# Patient Record
Sex: MALE | HISPANIC OR LATINO | Employment: UNEMPLOYED | URBAN - METROPOLITAN AREA
[De-identification: names, ages, dates, MRNs, and addresses within clinical notes are randomized per-mention and may not be internally consistent; named-entity substitution may affect disease eponyms.]

---

## 2017-02-03 ENCOUNTER — HOSPITAL ENCOUNTER (EMERGENCY)
Facility: MEDICAL CENTER | Age: 62
End: 2017-02-04
Attending: EMERGENCY MEDICINE

## 2017-02-03 DIAGNOSIS — R10.11 RIGHT UPPER QUADRANT ABDOMINAL PAIN: ICD-10-CM

## 2017-02-03 DIAGNOSIS — K80.20 SYMPTOMATIC CHOLELITHIASIS: ICD-10-CM

## 2017-02-03 LAB
ALBUMIN SERPL BCP-MCNC: 4.4 G/DL (ref 3.2–4.9)
ALBUMIN/GLOB SERPL: 1.5 G/DL
ALP SERPL-CCNC: 72 U/L (ref 30–99)
ALT SERPL-CCNC: 23 U/L (ref 2–50)
ANION GAP SERPL CALC-SCNC: 11 MMOL/L (ref 0–11.9)
AST SERPL-CCNC: 14 U/L (ref 12–45)
BASOPHILS # BLD AUTO: 0.4 % (ref 0–1.8)
BASOPHILS # BLD: 0.05 K/UL (ref 0–0.12)
BILIRUB SERPL-MCNC: 0.5 MG/DL (ref 0.1–1.5)
BUN SERPL-MCNC: 23 MG/DL (ref 8–22)
CALCIUM SERPL-MCNC: 9.4 MG/DL (ref 8.5–10.5)
CHLORIDE SERPL-SCNC: 100 MMOL/L (ref 96–112)
CO2 SERPL-SCNC: 23 MMOL/L (ref 20–33)
CREAT SERPL-MCNC: 1.05 MG/DL (ref 0.5–1.4)
EKG IMPRESSION: NORMAL
EOSINOPHIL # BLD AUTO: 0.01 K/UL (ref 0–0.51)
EOSINOPHIL NFR BLD: 0.1 % (ref 0–6.9)
ERYTHROCYTE [DISTWIDTH] IN BLOOD BY AUTOMATED COUNT: 44.8 FL (ref 35.9–50)
GFR SERPL CREATININE-BSD FRML MDRD: >60 ML/MIN/1.73 M 2
GLOBULIN SER CALC-MCNC: 3 G/DL (ref 1.9–3.5)
GLUCOSE SERPL-MCNC: 152 MG/DL (ref 65–99)
HCT VFR BLD AUTO: 45.4 % (ref 42–52)
HGB BLD-MCNC: 14.9 G/DL (ref 14–18)
IMM GRANULOCYTES # BLD AUTO: 0.04 K/UL (ref 0–0.11)
IMM GRANULOCYTES NFR BLD AUTO: 0.3 % (ref 0–0.9)
LIPASE SERPL-CCNC: 27 U/L (ref 11–82)
LYMPHOCYTES # BLD AUTO: 1.82 K/UL (ref 1–4.8)
LYMPHOCYTES NFR BLD: 13.4 % (ref 22–41)
MCH RBC QN AUTO: 27.5 PG (ref 27–33)
MCHC RBC AUTO-ENTMCNC: 32.8 G/DL (ref 33.7–35.3)
MCV RBC AUTO: 83.9 FL (ref 81.4–97.8)
MONOCYTES # BLD AUTO: 0.52 K/UL (ref 0–0.85)
MONOCYTES NFR BLD AUTO: 3.8 % (ref 0–13.4)
NEUTROPHILS # BLD AUTO: 11.13 K/UL (ref 1.82–7.42)
NEUTROPHILS NFR BLD: 82 % (ref 44–72)
NRBC # BLD AUTO: 0 K/UL
NRBC BLD AUTO-RTO: 0 /100 WBC
PLATELET # BLD AUTO: 244 K/UL (ref 164–446)
PMV BLD AUTO: 9.2 FL (ref 9–12.9)
POTASSIUM SERPL-SCNC: 3.7 MMOL/L (ref 3.6–5.5)
PROT SERPL-MCNC: 7.4 G/DL (ref 6–8.2)
RBC # BLD AUTO: 5.41 M/UL (ref 4.7–6.1)
SODIUM SERPL-SCNC: 134 MMOL/L (ref 135–145)
WBC # BLD AUTO: 13.6 K/UL (ref 4.8–10.8)

## 2017-02-03 PROCEDURE — 83690 ASSAY OF LIPASE: CPT

## 2017-02-03 PROCEDURE — 94760 N-INVAS EAR/PLS OXIMETRY 1: CPT

## 2017-02-03 PROCEDURE — 700111 HCHG RX REV CODE 636 W/ 250 OVERRIDE (IP): Performed by: EMERGENCY MEDICINE

## 2017-02-03 PROCEDURE — 85730 THROMBOPLASTIN TIME PARTIAL: CPT

## 2017-02-03 PROCEDURE — 93005 ELECTROCARDIOGRAM TRACING: CPT | Performed by: EMERGENCY MEDICINE

## 2017-02-03 PROCEDURE — 36415 COLL VENOUS BLD VENIPUNCTURE: CPT

## 2017-02-03 PROCEDURE — 99285 EMERGENCY DEPT VISIT HI MDM: CPT

## 2017-02-03 PROCEDURE — 96375 TX/PRO/DX INJ NEW DRUG ADDON: CPT

## 2017-02-03 PROCEDURE — 80053 COMPREHEN METABOLIC PANEL: CPT

## 2017-02-03 PROCEDURE — 700111 HCHG RX REV CODE 636 W/ 250 OVERRIDE (IP)

## 2017-02-03 PROCEDURE — 85025 COMPLETE CBC W/AUTO DIFF WBC: CPT

## 2017-02-03 PROCEDURE — 96374 THER/PROPH/DIAG INJ IV PUSH: CPT

## 2017-02-03 PROCEDURE — 85610 PROTHROMBIN TIME: CPT

## 2017-02-03 RX ORDER — ONDANSETRON 2 MG/ML
INJECTION INTRAMUSCULAR; INTRAVENOUS
Status: COMPLETED
Start: 2017-02-03 | End: 2017-02-03

## 2017-02-03 RX ORDER — SODIUM CHLORIDE, SODIUM LACTATE, POTASSIUM CHLORIDE, CALCIUM CHLORIDE 600; 310; 30; 20 MG/100ML; MG/100ML; MG/100ML; MG/100ML
1000 INJECTION, SOLUTION INTRAVENOUS ONCE
Status: COMPLETED | OUTPATIENT
Start: 2017-02-04 | End: 2017-02-04

## 2017-02-03 RX ORDER — MORPHINE SULFATE 4 MG/ML
4 INJECTION, SOLUTION INTRAMUSCULAR; INTRAVENOUS ONCE
Status: COMPLETED | OUTPATIENT
Start: 2017-02-04 | End: 2017-02-03

## 2017-02-03 RX ORDER — ONDANSETRON 2 MG/ML
4 INJECTION INTRAMUSCULAR; INTRAVENOUS ONCE
Status: COMPLETED | OUTPATIENT
Start: 2017-02-04 | End: 2017-02-03

## 2017-02-03 RX ADMIN — SODIUM CHLORIDE, POTASSIUM CHLORIDE, SODIUM LACTATE AND CALCIUM CHLORIDE 1000 ML: 600; 310; 30; 20 INJECTION, SOLUTION INTRAVENOUS at 23:43

## 2017-02-03 RX ADMIN — MORPHINE SULFATE 4 MG: 4 INJECTION INTRAVENOUS at 23:41

## 2017-02-03 RX ADMIN — ONDANSETRON 4 MG: 2 INJECTION, SOLUTION INTRAMUSCULAR; INTRAVENOUS at 23:34

## 2017-02-03 RX ADMIN — ONDANSETRON 4 MG: 2 INJECTION INTRAMUSCULAR; INTRAVENOUS at 23:34

## 2017-02-03 ASSESSMENT — PAIN SCALES - GENERAL: PAINLEVEL_OUTOF10: 3

## 2017-02-03 NOTE — ED AVS SNAPSHOT
2/4/2017          Addison Levi  [address]  [place] [address]    Dear Addison:    [facility] wants to ensure your discharge home is safe and you or your loved ones have had all your questions answered regarding your care after you leave the hospital.    You may receive a telephone call within two days of your discharge.  This call is to make certain you understand your discharge instructions as well as ensure we provided you with the best care possible during your stay with us.     The call will only last approximately 3-5 minutes and will be done by a nurse.    Once again, we want to ensure your discharge home is safe and that you have a clear understanding of any next steps in your care.  If you have any questions or concerns, please do not hesitate to contact us, we are here for you.  Thank you for choosing [facility] for your healthcare needs.    Sincerely,    Song Kumar    [facility]

## 2017-02-03 NOTE — ED AVS SNAPSHOT
After Visit Summary                                                                                                                Addison Levi   MRN: [id number]    Department:  [facility], Emergency Dept   Date of Visit:  2/3/2017            [facility], Emergency Dept    [address] [place] [address]    Phone:  [phone]      You were seen by     Elio Turpin M.D.      Your Diagnosis Was     Right upper quadrant abdominal pain     R10.11       These are the medications you received during your hospitalization from 02/03/2017 2305 to 02/04/2017 0320     Date/Time Order Dose Route Action    02/03/2017 2343 LR infusion (bolus) 1,000 mL Intravenous New Bag    02/03/2017 2341 morphine (pf) 4 mg/ml injection 4 mg 4 mg Intravenous Given    02/03/2017 2334 ondansetron (ZOFRAN) syringe/vial injection 4 mg 4 mg Intravenous Given    02/04/2017 0149 HYDROmorphone (DILAUDID) injection 1 mg 1 mg Intravenous Given    02/04/2017 0148 ondansetron (ZOFRAN) syringe/vial injection 4 mg 4 mg Intravenous Given      Follow-up Information     1. Follow up with [facility], Emergency Dept.    Specialty:  Emergency Medicine    Why:  in 12-24 hours if symptoms persist,, immediately if symptoms worsen    Contact information    [address] [place] [id number] [phone]        2. Schedule an appointment as soon as possible for a visit with Ubaldo Redding M.D..    Specialty:  Surgery    Contact information    [address] [facility] [address]  [facility] [id number] [phone]        Medication Information     Review all of your home medications and newly ordered medications with your primary doctor and/or pharmacist as soon as possible. Follow medication instructions as directed by your doctor and/or pharmacist.     Please keep your complete medication list with you and share with your physician. Update the information when medications are discontinued, doses are  changed, or new medications (including over-the-counter products) are added; and carry medication information at all times in the event of emergency situations.               Medication List      START taking these medications        Instructions    hydrocodone-acetaminophen 5-325 MG Tabs per tablet   Commonly known as:  NORCO    Take 1-2 Tabs by mouth every four hours as needed.   Dose:  1-2 Tab       ondansetron 4 MG Tbdp   Commonly known as:  ZOFRAN ODT    Take 1 Tab by mouth every 6 hours as needed for Nausea/Vomiting.   Dose:  4 mg               Procedures and tests performed during your visit     APTT    CARDIAC MONITORING    CBC WITH DIFFERENTIAL    COMP METABOLIC PANEL    CONSENT FOR CONTRAST INJECTION    CT-ABDOMEN-PELVIS WITH    Cardiac Monitoring    EKG (ER)    ESTIMATED GFR    IV Saline Lock    LIPASE    O2 Protocol    OLD EKG    Oxygen    PROTHROMBIN TIME (INR)    Pulse Ox    SALINE LOCK    URINALYSIS CULTURE, IF INDICATED    US-GALLBLADDER        Discharge Instructions       Dolor abdominal, versión ampliada  (Abdominal Pain, Nonspecific)  El análisis podría no mostrar la razón exacta por la que tiene dolor abdominal. Debido a que hay muchas causas distintas de dolor abdominal, se podrá necesitar otro control y más análisis. Es muy importante el seguimiento para observar los síntomas duraderos (persistentes) o los que empeoran. Ann causa posible de dolor abdominal en cualquier persona que aún tiene roberts apéndice es la apendicitis aguda. La apendicitis es a menudo difícil de diagnosticar. Los análisis de lebron, orina, ultrasonido y tomografía computada no pueden descartar por completo la apendicitis u otra causas de dolor abdominal. A veces, sólo los cambios que se producen a través del tiempo permitirán determinar si el dolor abdominal se debe al apendicitis o a otras causas. Otros problemas potenciales que pueden requerir cirugía también pueden ugo algún tiempo hasta ser evidentes. Debido a esto, es  importante seguir todas las instrucciones de más abajo.  INSTRUCCIONES PARA EL CUIDADO DOMICILIARIO  · Descanse todo lo que pueda.   · No ingiera alimentos sólidos hasta que el dolor desaparezca.   · Cuando un adulto o un jose siente dolor: Puede beneficiarlo zach dieta basada en agua, té liviano descafeinado, caldo o consomé, gelatina, solución de rehidratación oral, helados de agua o trocitos de hielo.   · Cuando el adulto o el jose no sienten más dolor: Consuma zach dieta liviana (tostadas secas, crackers, jugo de manzana o arroz chester). Incorpore más alimentos lentamente, siempre que esto no le cause ningún trastorno. No consuma productos lácteos (incluyendo queso y huevos) ni ingiera alimentos condimentados, grasos, fritos o con gran cantidad de fibra.   · No consuma alcohol, cafeína ni cigarrillos.   · [place] deacon medicamentos regularmente, excepto que el profesional le indique lo contrario.   · Utilice los medicamentos de venta risa o de prescripción para el dolor, el malestar o la fiebre, según se lo indique el profesional que lo asiste.   · Utilice los medicamentos de venta risa o de prescripción para el dolor, el malestar o la fiebre, según se lo indique el profesional que lo asiste. No administre aspirina a los niños.   Si el médico le ha dado fecha para zach visita de control, es importante que concurra. No cumplir con tuut control puede josafat bisi resultado que el daño, el dolor o la discapacidad steven permanentes (crónicos). Si tiene problemas para asistir al control, deberá comunicarlo en tutu establecimiento para recibir asesoramiento.   SOLICITE ATENCIÓN MÉDICA DE INMEDIATO SI:  · Usted o roberts jose soto sufrido dolor por más de 24 horas.   · El dolor empeora, cambia de lugar o se siente diferente.   · Usted o roberts jose tienen zach temperatura oral de más de 102° F (38.9° C) y no puede ser controlada con medicamentos.   · Roberts bebé tiene más de 3 meses y roberts temperatura rectal es de 102° F (38.9° C) o más.   · Roberts  bebé tiene 3 meses o menos y roberts temperatura rectal es de 100.4° F (38° C) o más.   · Usted o roberts hijo tienen escalofríos.   · Continúan con vómitos y no pueden retener líquidos.   · Observa lebron en el vómito o en la materia fecal.   · Las heces son oscuras o negras.   · Los movimientos intestinales son frecuentes.   · Los movimientos intestinales se detienen (hay zach obstrucción) o no pueden eliminarse los gases.   · Siente dolor al orinar o lo hace con frecuencia u observa lebron en la orina.   · La piel y la cheli nick de los ojos cambian de color y se tornan amarillos.   · Observa que el estómago se hincha o está más magda.   · Sienten mareos o desmayos.   · Sienten dolor en el pecho o la espalda.   ESTÉ SEGURO QUE:   · Comprende las instrucciones para el robby médica.   · Controlará roberts enfermedad.   · Solicitará atención médica de inmediato según las indicaciones.   Document Released: 03/26/2009 Document Revised: 03/11/2013  ExitCare® Patient Information ©2013 [employer].  Colelitiasis  (Cholelithiasis)  La colelitiasis (también llamada cálculos en la vesícula) es zach enfermedad en la que se oma piedras en la vesícula. La vesícula es un órgano que almacena la bilis que se forma en el hígado y que ayuda a digerir grasas. Los cálculos comienzan bisi pequeños anamika y lentamente se transforman en piedras. El dolor en la vesícula ocurre cuando se producen espasmos y los cálculos obstruyen el conducto. El dolor también se produce cuando zach epi sale por el conducto.   FACTORES DE RIESGO  · Ser donell.    · Tener embarazos múltiples. Algunas veces los médicos aconsejan extirpar los cálculos biliares antes de futuros embarazos.    · Ser rosangela.  · Dietas que incluyan comidas fritas y grasas.    · Ser mayor de 60 años y el aumento de la edad.    · El uso prolongado de medicamentos que contengan hormonas femeninas.    · Tener diabetes mellitus.    · Pérdida rápida de peso.    · Historia familiar de cálculos  "(herencia).    SÍNTOMAS  · Náuseas.    · Vómitos.  · Dolor abdominal.    · Piel amarilla (ictericia)    · Dolor súbito. Puede persistir desde algunos minutos hasta algunas horas.  · Fiebre.    · Sensibilidad al tacto.   En algunos casos, cuando los cálculos biliares no se mueven hacia el conducto biliar, las personas no sienten dolor ni presentan síntomas. Estos se denominan cálculos \"silenciosos\".   TRATAMIENTO  Los cálculos silenciosos no requieren tratamiento. En los casos graves, podrá ser necesaria zach cirugía de urgencia. Las opciones de tratamiento son:  · Cirugía para extirpar la vesícula. Es el tratamiento más frecuente.  · Medicamentos. No siempre dan resultado y pueden demorar entre 6 y 12 meses o más en hacer efecto.  · Tratamiento con ondas de choque (litotricia biliar extracorporal). En tutu tratamiento, zach máquina de ultrasonido envía ondas de choque a la vesícula para destruir los cálculos en pequeños fragmentos que luego podrán pasar a los intestinos o ser disueltas con medicamentos.  INSTRUCCIONES PARA EL CUIDADO EN EL HOGAR   · Sólo tome medicamentos de venta risa o recetados para calmar el dolor, el malestar o bajar la fiebre, según las indicaciones de roberts médico.    · Siga zach dieta baja en grasas hasta que roberts médico lo sushil nuevamente. Las grasas hacen que la vesícula se contraiga, lo que puede producir dolor.    · Concurra a las consultas de control con roberts médico según las indicaciones. Los ataques ranjan siempre son recurrentes y generalmente habrá que someterse a zach cirugía bisi tratamiento permanente.    SOLICITE ATENCIÓN MÉDICA DE INMEDIATO SI:   · El dolor aumenta y no puede controlarlo con los medicamentos.    · Tiene fiebre o síntomas persistentes oscar más de 2 - 3 ruby.    · Tiene fiebre y los síntomas empeoran repentinamente.    · Tiene náuseas o vómitos persistentes.    ASEGÚRESE DE QUE:   · Comprende estas instrucciones.  · Controlará roberts afección.  · Recibirá ayuda de inmediato si " no mejora o si empeora.     Esta información no tiene bisi fin reemplazar el consejo del médico. Asegúrese de hacerle al médico cualquier pregunta que tenga.     Document Released: 10/04/2007 Document Revised: 08/20/2014  [employer] Interactive Patient Education ©2016 [employer]            Patient Information     Patient Information    Following emergency treatment: all patient requiring follow-up care must return either to a private physician or a clinic if your condition worsens before you are able to obtain further medical attention, please return to the emergency room.     Billing Information    At [facility], we work to make the billing process streamlined for our patients.  Our Representatives are here to answer any questions you may have regarding your hospital bill.  If you have insurance coverage and have supplied your insurance information to us, we will submit a claim to your insurer on your behalf.  Should you have any questions regarding your bill, we can be reached online or by phone as follows:  Online: You are able pay your bills online or live chat with our representatives about any billing questions you may have. We are here to help Monday - Friday from 8:00am to 7:30pm and 9:00am - 12:00pm on Saturdays.  Please visit [email or web address]  for more information.   Phone:  [phone] or [phone]    Please note that your emergency physician, surgeon, pathologist, radiologist, anesthesiologist, and other specialists are not employed by [facility] and will therefore bill separately for their services.  Please contact them directly for any questions concerning their bills at the numbers below:     Emergency Physician Services:  [phone]  [facility]:  [phone]  [facility]:  [phone]  [facility]:  [phone]    1. Your final bill may vary from the amount quoted upon discharge if all procedures are not  complete at that time, or if your doctor has additional procedures of which we are not aware. You will receive an additional bill if you return to the Emergency Department at [facility] for suture removal regardless of the facility of which the sutures were placed.     2. Please arrange for settlement of this account at the emergency registration.    3. All self-pay accounts are due in full at the time of treatment.  If you are unable to meet this obligation then payment is expected within 4-5 days.     4. If you have had radiology studies (CT, X-ray, Ultrasound, MRI), you have received a preliminary result during your emergency department visit. Please contact the radiology department [phone] to receive a copy of your final result. Please discuss the Final result with your primary physician or with the follow up physician provided.     Crisis Hotline:  [place] Crisis Hotline:  7-301-NNYQMXU or 1-805.743.3378  [place] Crisis Hotline:    1-498.449.7014 or 517-784-5960         ED Discharge Follow Up Questions    1. In order to provide you with very good care, we would like to follow up with a phone call in the next few days.  May we have your permission to contact you?     YES /  NO    2. What is the best phone number to call you? (       )_____-__________    3. What is the best time to call you?      Morning  /  Afternoon  /  Evening                   Patient Signature:  ____________________________________________________________    Date:  ____________________________________________________________      Your appointments     Feb 04, 2017 11:55 PM   CT [id number] with [facility] CT TRAUMA   [facility]    [address] [facility] [address]   [phone]

## 2017-02-03 NOTE — ED AVS SNAPSHOT
[email or web address] Access Code: [email or web address]  Expires: 3/6/2017  3:20 AM    Your email address is not on file at [facility].  Email Addresses are required for you to sign up for [email or web address], please contact [phone] to verify your personal information and to provide your email address prior to attempting to register for [email or web address].    Addison Levi  [address] [place] [address]    [email or web address]  A secure, online tool to manage your health information     [facility]’s [email or web address]® is a secure, online tool that connects you to your personalized health information from the privacy of your home -- day or night - making it very easy for you to manage your healthcare. Once the activation process is completed, you can even access your medical information using the [email or web address] jayda, which is available for free in the Apple Jayda store or Google Play store.     To learn more about [email or web address], visit [email or web address]    There are two levels of access available (as shown below):   My Chart Features  [facility] Primary Care Doctor [facility]  Specialists [facility]  Urgent  Care Non-[facility] Primary Care Doctor   Email your healthcare team securely and privately 24/7 X X X    Manage appointments: schedule your next appointment; view details of past/upcoming appointments X      Request prescription refills. X      View recent personal medical records, including lab and immunizations X X X X   View health record, including health history, allergies, medications X X X X   Read reports about your outpatient visits, procedures, consult and ER notes X X X X   See your discharge summary, which is a recap of your hospital and/or ER visit that includes your diagnosis, lab results, and care plan X X  X     How to register for [employer]:  Once your e-mail address has been verified, follow the following steps to sign up for [email or web address].     1. Go to  [email or web address]  2. Click on the Sign Up Now box, which takes you to the New Member Sign Up page. You will  need to provide the following information:  a. Enter your [facility] Access Code exactly as it appears at the top of this page. (You will not need to use this code after you’ve completed the sign-up process. If you do not sign up before the expiration date, you must request a new code.)   b. Enter your date of birth.   c. Enter your home email address.   d. Click Submit, and follow the next screen’s instructions.  3. Create a Beanstalk Taxt ID. This will be your [facility] login ID and cannot be changed, so think of one that is secure and easy to remember.  4. Create a [facility] password. You can change your password at any time.  5. Enter your Password Reset Question and Answer. This can be used at a later time if you forget your password.   6. Enter your e-mail address. This allows you to receive e-mail notifications when new information is available in [facility].  7. Click Sign Up. You can now view your health information.    For assistance activating your [facility] account, call [phone]

## 2017-02-04 ENCOUNTER — APPOINTMENT (OUTPATIENT)
Dept: RADIOLOGY | Facility: MEDICAL CENTER | Age: 62
End: 2017-02-04
Attending: EMERGENCY MEDICINE

## 2017-02-04 VITALS
RESPIRATION RATE: 1 BRPM | WEIGHT: 160 LBS | OXYGEN SATURATION: 97 % | BODY MASS INDEX: 25.71 KG/M2 | HEIGHT: 66 IN | HEART RATE: 55 BPM | SYSTOLIC BLOOD PRESSURE: 137 MMHG | DIASTOLIC BLOOD PRESSURE: 84 MMHG

## 2017-02-04 LAB
APPEARANCE UR: CLEAR
APTT PPP: 22.7 SEC (ref 24.7–36)
BILIRUB UR QL STRIP.AUTO: NEGATIVE
COLOR UR: NORMAL
CULTURE IF INDICATED INDCX: NO UA CULTURE
GLUCOSE UR STRIP.AUTO-MCNC: NEGATIVE MG/DL
INR PPP: 0.93 (ref 0.87–1.13)
KETONES UR STRIP.AUTO-MCNC: NEGATIVE MG/DL
LEUKOCYTE ESTERASE UR QL STRIP.AUTO: NEGATIVE
MICRO URNS: NORMAL
NITRITE UR QL STRIP.AUTO: NEGATIVE
PH UR STRIP.AUTO: 5.5 [PH]
PROT UR QL STRIP: NEGATIVE MG/DL
PROTHROMBIN TIME: 12.8 SEC (ref 12–14.6)
RBC UR QL AUTO: NEGATIVE
SP GR UR STRIP.AUTO: 1.02

## 2017-02-04 PROCEDURE — 74177 CT ABD & PELVIS W/CONTRAST: CPT

## 2017-02-04 PROCEDURE — 700111 HCHG RX REV CODE 636 W/ 250 OVERRIDE (IP): Performed by: EMERGENCY MEDICINE

## 2017-02-04 PROCEDURE — 76705 ECHO EXAM OF ABDOMEN: CPT

## 2017-02-04 PROCEDURE — 96375 TX/PRO/DX INJ NEW DRUG ADDON: CPT

## 2017-02-04 PROCEDURE — 96376 TX/PRO/DX INJ SAME DRUG ADON: CPT

## 2017-02-04 PROCEDURE — 81003 URINALYSIS AUTO W/O SCOPE: CPT

## 2017-02-04 RX ORDER — ONDANSETRON 2 MG/ML
4 INJECTION INTRAMUSCULAR; INTRAVENOUS ONCE
Status: COMPLETED | OUTPATIENT
Start: 2017-02-04 | End: 2017-02-04

## 2017-02-04 RX ORDER — HYDROCODONE BITARTRATE AND ACETAMINOPHEN 5; 325 MG/1; MG/1
1-2 TABLET ORAL EVERY 4 HOURS PRN
Qty: 21 TAB | Refills: 0 | Status: ON HOLD | OUTPATIENT
Start: 2017-02-04 | End: 2017-02-21

## 2017-02-04 RX ORDER — ONDANSETRON 4 MG/1
4 TABLET, ORALLY DISINTEGRATING ORAL EVERY 6 HOURS PRN
Qty: 10 TAB | Refills: 0 | Status: ON HOLD | OUTPATIENT
Start: 2017-02-04 | End: 2017-02-21

## 2017-02-04 RX ADMIN — ONDANSETRON 4 MG: 2 INJECTION, SOLUTION INTRAMUSCULAR; INTRAVENOUS at 01:48

## 2017-02-04 RX ADMIN — HYDROMORPHONE HYDROCHLORIDE 1 MG: 1 INJECTION, SOLUTION INTRAMUSCULAR; INTRAVENOUS; SUBCUTANEOUS at 01:49

## 2017-02-04 NOTE — ED PROVIDER NOTES
"ED Provider Note    Scribed for Elio Turpin M.D. by Anneliese Bucio. 2/3/2017, 11:23 PM.    Means of arrival: ambulance  History obtained from: patient  History limited by: none (translated by daughter)    CHIEF COMPLAINT  Chief Complaint   Patient presents with   • N/V   • Abdominal Pain       HPI  Addison Levi is a 61 y.o. male who presents to the Emergency Department for worsening  right sided abdominal pain onset around 2PM with associated vomiting(3x), nausea, back pain, and dizziness. Patient has never underwent any abdominal surgeries. No complaints of bloody urine, painful urination, bowel movement irregularities. Patient has no allergies or outstanding medical problems. Pains currently rated as a 10 out of 10. No alleviating factors. Patient did state that his pain began after eating a very spicy meal at lunch. He's had no headache no altered mental status congestion chest pain or shortness of breath. No other complaints at this time. Patient did state that the pain initially started in the right lower quadrant then progressed to the periumbilical region.    REVIEW OF SYSTEMS  10 systems reviewed and otherwise negative, pertinent positives and negatives listed in the history of present illness.    PAST MEDICAL HISTORY   no pertinent past medical history    SURGICAL HISTORY  patient denies any surgical history    SOCIAL HISTORY  No pertinent social history    FAMILY HISTORY  Non-Contributory    CURRENT MEDICATIONS  Home Medications     Reviewed by Nerissa Segal R.N. (Registered Nurse) on 02/03/17 at 2350  Med List Status: Partial    Medication Last Dose Status          Patient Gilles Taking any Medications                        ALLERGIES  No Known Allergies    PHYSICAL EXAM  VITAL SIGNS: /84 mmHg  Pulse 60  Resp 18  Ht 1.676 m (5' 5.98\")  Wt 72.576 kg (160 lb)  BMI 25.84 kg/m2  SpO2 99%    Pulse ox interpretation: I interpret this pulse ox as normal.  Constitutional: Alert and " oriented x 3, moderate Distress, actively vomiting  HEENT: Atraumatic normocephalic, pupils are equal round reactive to light extraocular movements are intact. The nares is clear, external ears are normal, mouth shows moist mucous membranes  Neck: Supple, no JVD no tracheal deviation  Cardiovascular: borderline tachycardia and normal rhythm no murmur rub or gallop 2+ pulses peripherally x4  Thorax & Lungs: No respiratory distress, no wheezes rales or rhonchi, No chest tenderness.   GI: Soft, dipti umbilical and RLQ tenderness ,non distended positive bowel sounds, no peritoneal signs  Skin: Warm dry no acute rash or lesion  Musculoskeletal: Moving all extremities with full range and 5 of 5 strength, no acute  deformity  Neurologic: Cranial nerves III through XII are grossly intact, no sensory deficit, no cerebellar dysfunction   Psychiatric: Appropriate affect for situation at this time      DIAGNOSTIC STUDIES / PROCEDURES  LABS  Results for orders placed or performed during the hospital encounter of 02/03/17   CBC WITH DIFFERENTIAL   Result Value Ref Range    WBC 13.6 (H) 4.8 - 10.8 K/uL    RBC 5.41 4.70 - 6.10 M/uL    Hemoglobin 14.9 14.0 - 18.0 g/dL    Hematocrit 45.4 42.0 - 52.0 %    MCV 83.9 81.4 - 97.8 fL    MCH 27.5 27.0 - 33.0 pg    MCHC 32.8 (L) 33.7 - 35.3 g/dL    RDW 44.8 35.9 - 50.0 fL    Platelet Count 244 164 - 446 K/uL    MPV 9.2 9.0 - 12.9 fL    Neutrophils-Polys 82.00 (H) 44.00 - 72.00 %    Lymphocytes 13.40 (L) 22.00 - 41.00 %    Monocytes 3.80 0.00 - 13.40 %    Eosinophils 0.10 0.00 - 6.90 %    Basophils 0.40 0.00 - 1.80 %    Immature Granulocytes 0.30 0.00 - 0.90 %    Nucleated RBC 0.00 /100 WBC    Neutrophils (Absolute) 11.13 (H) 1.82 - 7.42 K/uL    Lymphs (Absolute) 1.82 1.00 - 4.80 K/uL    Monos (Absolute) 0.52 0.00 - 0.85 K/uL    Eos (Absolute) 0.01 0.00 - 0.51 K/uL    Baso (Absolute) 0.05 0.00 - 0.12 K/uL    Immature Granulocytes (abs) 0.04 0.00 - 0.11 K/uL    NRBC (Absolute) 0.00 K/uL   COMP  METABOLIC PANEL   Result Value Ref Range    Sodium 134 (L) 135 - 145 mmol/L    Potassium 3.7 3.6 - 5.5 mmol/L    Chloride 100 96 - 112 mmol/L    Co2 23 20 - 33 mmol/L    Anion Gap 11.0 0.0 - 11.9    Glucose 152 (H) 65 - 99 mg/dL    Bun 23 (H) 8 - 22 mg/dL    Creatinine 1.05 0.50 - 1.40 mg/dL    Calcium 9.4 8.5 - 10.5 mg/dL    AST(SGOT) 14 12 - 45 U/L    ALT(SGPT) 23 2 - 50 U/L    Alkaline Phosphatase 72 30 - 99 U/L    Total Bilirubin 0.5 0.1 - 1.5 mg/dL    Albumin 4.4 3.2 - 4.9 g/dL    Total Protein 7.4 6.0 - 8.2 g/dL    Globulin 3.0 1.9 - 3.5 g/dL    A-G Ratio 1.5 g/dL   LIPASE   Result Value Ref Range    Lipase 27 11 - 82 U/L   URINALYSIS CULTURE, IF INDICATED   Result Value Ref Range    Color Lt. Yellow     Character Clear     Specific Gravity 1.024 <1.035    Ph 5.5 5.0-8.0    Glucose Negative Negative mg/dL    Ketones Negative Negative mg/dL    Protein Negative Negative mg/dL    Bilirubin Negative Negative    Nitrite Negative Negative    Leukocyte Esterase Negative Negative    Occult Blood Negative Negative    Micro Urine Req see below     Culture Indicated No UA Culture   PROTHROMBIN TIME (INR)   Result Value Ref Range    PT 12.8 12.0 - 14.6 sec    INR 0.93 0.87 - 1.13   APTT   Result Value Ref Range    APTT 22.7 (L) 24.7 - 36.0 sec   ESTIMATED GFR   Result Value Ref Range    GFR If African American >60 >60 mL/min/1.73 m 2    GFR If Non African American >60 >60 mL/min/1.73 m 2       All labs reviewed by me.    EKG Interpretation  Interpreted by me    Rhythm:  Normal sinus rhythm   Rate: 55  Axis: normal  Ectopy: none  Conduction: normal  ST Segments: no acute change  T Waves: no acute change  Q Waves: none  Clinical Impression: 1st degree AV block    RADIOLOGY  US-GALLBLADDER   Final Result         1. Cholelithiasis. No sonographic evidence of acute cholecystitis.      2. Mildly echogenic liver, likely mild hepatic steatosis.      CT-ABDOMEN-PELVIS WITH   Final Result         1. No acute abnormality identified  "in the abdomen or pelvis.      2. Large hiatal hernia.        The radiologist's interpretation of all radiological studies have been reviewed by me.    COURSE & MEDICAL DECISION MAKING  Pertinent Labs & Imaging studies reviewed. (See chart for details)    11:23 PM - Patient seen and examined at bedside. Patient will be treated with 4mg IV Morphine and 4mg IV Zofran. Ordered abdomen pelvis CT, UA culture, prothrombin time, APTT, CBC, CMP, lipase, UA, and EKG to evaluate his symptoms. I voiced my concerns with a possible problem with the patient's appendix.    1:18 AM I re-evaluated patient at bedside. He is still experiencing abdominal pain and nausea, patient does state that he pain is radiated further into the right upper quadrant.. I informed him that his CT and blood work came back relatively normal so I no longer believe he is suffering from Appendicitis. I will order a gall bladder US for further evaluation.    3:05 AM I re-evaluated patient at bedside and informed him that he will be discharged. He reports feeling completely resolved of all symptoms. I counseled him on pre-cautionary measures and advised him to return to the ER for any new onset of fever, blood in his stool or bloody emesis. He will follow up with general surgery as an outpatient for possibility of elective cholecystectomy. His repeat abdominal exam is benign vital signs improved.  /84 mmHg  Pulse 55  Resp 1  Ht 1.676 m (5' 5.98\")  Wt 72.576 kg (160 lb)  BMI 25.84 kg/m2  SpO2 97%      Medical Decision Making: Very pleasant 61-year-old male presents with vague abdominal pain. In moderate distress at arrival. CT shows no acute inflammatory process or other surgical process. Labs are fairly unremarkable very slight leukocytosis with left shift no elevation of immature granulocytes. Patient's pain was completely alleviated with IV Dilaudid and Zofran in the ER. His repeat abdominal exam is completely benign. Ultrasound was performed " "which does show cholelithiasis without evidence of cholecystitis. Patient's instructed to avoid very fatty meals. He is given prescription for Norco and Zofran. Instructed to take stool softener with pain medication. The patient understands return in 12-24 hours if having ongoing abdominal pain sooner should he develop fevers nausea vomiting or any other acute symptoms of concern he is otherwise to follow-up with general surgery as outpatient. Discharged home in stable condition./84 mmHg  Pulse 55  Resp 1  Ht 1.676 m (5' 5.98\")  Wt 72.576 kg (160 lb)  BMI 25.84 kg/m2  SpO2 97%      I reviewed prescription monitoring program for patient's narcotic use before prescribing a scheduled drug.The patient will not drink alcohol nor drive with prescribed medications. The patient will return for new or worsening symptoms and is stable at the time of discharge.    The patient is referred to a primary physician for blood pressure management, diabetic screening, and for all other preventative health concerns.    DISPOSITION:  Patient will be discharged home in stable condition.    FOLLOW UP:  [facility], Emergency Dept  [address] [id number] [phone]    in 12-24 hours if symptoms persist,, immediately if symptoms worsen    Ubaldo Redding M.D.  [address]  [facility] [id number] [phone]    Schedule an appointment as soon as possible for a visit        OUTPATIENT MEDICATIONS:  Discharge Medication List as of 2/4/2017  3:20 AM      START taking these medications    Details   hydrocodone-acetaminophen (NORCO) 5-325 MG Tab per tablet Take 1-2 Tabs by mouth every four hours as needed., Disp-21 Tab, R-0, Print Rx Paper      ondansetron (ZOFRAN ODT) 4 MG TABLET DISPERSIBLE Take 1 Tab by mouth every 6 hours as needed for Nausea/Vomiting., Disp-10 Tab, R-0, Print Rx Paper                 FINAL IMPRESSION  1. Right upper quadrant abdominal pain    2. Symptomatic " cholelithiasis           This dictation has been created using voice recognition software and/or scribes. The accuracy of the dictation is limited by the abilities of the software and the expertise of the scribes. I expect there may be some errors of grammar and possibly content. I made every attempt to manually correct the errors within my dictation. However, errors related to voice recognition software and/or scribes may still exist and should be interpreted within the appropriate context.     I, Anneliese Bucio (Scribe), am scribing for, and in the presence of, Elio Turpin M.D..    Electronically signed by: Anneliese Bucio (Scribe), 2/3/2017    I, Elio Turpin M.D. personally performed the services described in this documentation, as scribed by Anneliese Bucio in my presence, and it is both accurate and complete.    The note accurately reflects work and decisions made by me.  Elio Turpin  2/4/2017  5:29 AM

## 2017-02-04 NOTE — ED NOTES
Pt presents via [facility] for nausea, vomiting, and abdominal pain RLQ x 4 hours, pt is pale, cool, diaphoretic. Denies any medical history. Speaks in full sentences, resps unlabored.

## 2017-02-04 NOTE — DISCHARGE INSTRUCTIONS
Dolor abdominal, versión ampliada  (Abdominal Pain, Nonspecific)  El análisis podría no mostrar la razón exacta por la que tiene dolor abdominal. Debido a que hay muchas causas distintas de dolor abdominal, se podrá necesitar otro control y más análisis. Es muy importante el seguimiento para observar los síntomas duraderos (persistentes) o los que empeoran. Zach causa posible de dolor abdominal en cualquier persona que aún tiene roberts apéndice es la apendicitis aguda. La apendicitis es a menudo difícil de diagnosticar. Los análisis de lebron, orina, ultrasonido y tomografía computada no pueden descartar por completo la apendicitis u otra causas de dolor abdominal. A veces, sólo los cambios que se producen a través del tiempo permitirán determinar si el dolor abdominal se debe al apendicitis o a otras causas. Otros problemas potenciales que pueden requerir cirugía también pueden ugo algún tiempo hasta ser evidentes. Debido a esto, es importante seguir todas las instrucciones de más abajo.  INSTRUCCIONES PARA EL CUIDADO DOMICILIARIO  · Descanse todo lo que pueda.   · No ingiera alimentos sólidos hasta que el dolor desaparezca.   · Cuando un adulto o un jose siente dolor: Puede beneficiarlo zach dieta basada en agua, té liviano descafeinado, caldo o consomé, gelatina, solución de rehidratación oral, helados de agua o trocitos de hielo.   · Cuando el adulto o el jose no sienten más dolor: Consuma zach dieta liviana (tostadas secas, crackers, jugo de manzana o arroz chester). Incorpore más alimentos lentamente, siempre que esto no le cause ningún trastorno. No consuma productos lácteos (incluyendo queso y huevos) ni ingiera alimentos condimentados, grasos, fritos o con gran cantidad de fibra.   · No consuma alcohol, cafeína ni cigarrillos.   · [place] de[place] medicamentos regularmente, excepto que el profesional le indique lo contrario.   · Utilice los medicamentos de venta risa o de prescripción para el dolor, el malestar o la  fiebre, según se lo indique el profesional que lo asiste.   · Utilice los medicamentos de venta risa o de prescripción para el dolor, el malestar o la fiebre, según se lo indique el profesional que lo asiste. No administre aspirina a los niños.   Si el médico le ha dado fecha para zach visita de control, es importante que concurra. No cumplir con tutu control puede josafat bisi resultado que el daño, el dolor o la discapacidad steven permanentes (crónicos). Si tiene problemas para asistir al control, deberá comunicarlo en tutu establecimiento para recibir asesoramiento.   SOLICITE ATENCIÓN MÉDICA DE INMEDIATO SI:  · Usted o roberts jose soto sufrido dolor por más de 24 horas.   · El dolor empeora, cambia de lugar o se siente diferente.   · Usted o roberts jose tienen zach temperatura oral de más de 102° F (38.9° C) y no puede ser controlada con medicamentos.   · Roberts bebé tiene más de 3 meses y roberts temperatura rectal es de 102° F (38.9° C) o más.   · Roberts bebé tiene 3 meses o menos y roberts temperatura rectal es de 100.4° F (38° C) o más.   · Usted o roberts hijo tienen escalofríos.   · Continúan con vómitos y no pueden retener líquidos.   · Observa lebron en el vómito o en la materia fecal.   · Las heces son oscuras o negras.   · Los movimientos intestinales son frecuentes.   · Los movimientos intestinales se detienen (hay zach obstrucción) o no pueden eliminarse los gases.   · Siente dolor al orinar o lo hace con frecuencia u observa lebron en la orina.   · La piel y la cheli nick de los ojos cambian de color y se tornan amarillos.   · Observa que el estómago se hincha o está más magda.   · Sienten mareos o desmayos.   · Sienten dolor en el pecho o la espalda.   ESTÉ SEGURO QUE:   · Comprende las instrucciones para el robby médica.   · Controlará roberts enfermedad.   · Solicitará atención médica de inmediato según las indicaciones.   Document Released: 03/26/2009 Document Revised: 03/11/2013  [employer]® Patient Information ©2013 [facility] [place],  "LL[employer].  Colelitiasis  (Cholelithiasis)  La colelitiasis (también llamada cálculos en la vesícula) es zach enfermedad en la que se oma piedras en la vesícula. La vesícula es un órgano que almacena la bilis que se forma en el hígado y que ayuda a digerir grasas. Los cálculos comienzan bisi pequeños anamika y lentamente se transforman en piedras. El dolor en la vesícula ocurre cuando se producen espasmos y los cálculos obstruyen el conducto. El dolor también se produce cuando zach epi sale por el conducto.   FACTORES DE RIESGO  · Ser donell.    · Tener embarazos múltiples. Algunas veces los médicos aconsejan extirpar los cálculos biliares antes de futuros embarazos.    · Ser rosangela.  · Dietas que incluyan comidas fritas y grasas.    · Ser mayor de 60 años y el aumento de la edad.    · El uso prolongado de medicamentos que contengan hormonas femeninas.    · Tener diabetes mellitus.    · Pérdida rápida de peso.    · Historia familiar de cálculos (herencia).    SÍNTOMAS  · Náuseas.    · Vómitos.  · Dolor abdominal.    · Piel amarilla (ictericia)    · Dolor súbito. Puede persistir desde algunos minutos hasta algunas horas.  · Fiebre.    · Sensibilidad al tacto.   En algunos casos, cuando los cálculos biliares no se mueven hacia el conducto biliar, las personas no sienten dolor ni presentan síntomas. Estos se denominan cálculos \"silenciosos\".   TRATAMIENTO  Los cálculos silenciosos no requieren tratamiento. En los casos graves, podrá ser necesaria zach cirugía de urgencia. Las opciones de tratamiento son:  · Cirugía para extirpar la vesícula. Es el tratamiento más frecuente.  · Medicamentos. No siempre dan resultado y pueden demorar entre 6 y 12 meses o más en hacer efecto.  · Tratamiento con ondas de choque (litotricia biliar extracorporal). En tutu tratamiento, zach máquina de ultrasonido envía ondas de choque a la vesícula para destruir los cálculos en pequeños fragmentos que luego podrán pasar a los intestinos o ser " disueltas con medicamentos.  INSTRUCCIONES PARA EL CUIDADO EN EL HOGAR   · Sólo tome medicamentos de venta risa o recetados para calmar el dolor, el malestar o bajar la fiebre, según las indicaciones de roberts médico.    · Siga zach dieta baja en grasas hasta que roberts médico lo sushil nuevamente. Las grasas hacen que la vesícula se contraiga, lo que puede producir dolor.    · Concurra a las consultas de control con roberts médico según las indicaciones. Los ataques ranjan siempre son recurrentes y generalmente habrá que someterse a zach cirugía bisi tratamiento permanente.    SOLICITE ATENCIÓN MÉDICA DE INMEDIATO SI:   · El dolor aumenta y no puede controlarlo con los medicamentos.    · Tiene fiebre o síntomas persistentes oscar más de 2 - 3 ruby.    · Tiene fiebre y los síntomas empeoran repentinamente.    · Tiene náuseas o vómitos persistentes.    ASEGÚRESE DE QUE:   · Comprende estas instrucciones.  · Controlará roberts afección.  · Recibirá ayuda de inmediato si no mejora o si empeora.     Esta información no tiene bisi fin reemplazar el consejo del médico. Asegúrese de hacerle al médico cualquier pregunta que tenga.     Document Released: 10/04/2007 Document Revised: 08/20/2014  [employer] Patient Education ©2016 [employer]

## 2017-02-17 ENCOUNTER — APPOINTMENT (OUTPATIENT)
Dept: ADMISSIONS | Facility: MEDICAL CENTER | Age: 62
End: 2017-02-17

## 2017-02-21 ENCOUNTER — HOSPITAL ENCOUNTER (OUTPATIENT)
Facility: MEDICAL CENTER | Age: 62
End: 2017-02-21
Attending: SURGERY | Admitting: SURGERY

## 2017-02-21 VITALS
HEIGHT: 67 IN | RESPIRATION RATE: 18 BRPM | WEIGHT: 159.83 LBS | BODY MASS INDEX: 25.09 KG/M2 | OXYGEN SATURATION: 97 % | SYSTOLIC BLOOD PRESSURE: 126 MMHG | HEART RATE: 65 BPM | TEMPERATURE: 97.9 F | DIASTOLIC BLOOD PRESSURE: 89 MMHG

## 2017-02-21 PROBLEM — K80.10 CALCULUS OF GALLBLADDER WITH CHOLECYSTITIS: Status: ACTIVE | Noted: 2017-02-21

## 2017-02-21 PROCEDURE — 160025 RECOVERY II MINUTES (STATS): Performed by: SURGERY

## 2017-02-21 PROCEDURE — 160002 HCHG RECOVERY MINUTES (STAT): Performed by: SURGERY

## 2017-02-21 PROCEDURE — 501838 HCHG SUTURE GENERAL: Performed by: SURGERY

## 2017-02-21 PROCEDURE — 160035 HCHG PACU - 1ST 60 MINS PHASE I: Performed by: SURGERY

## 2017-02-21 PROCEDURE — 500002 HCHG ADHESIVE, DERMABOND: Performed by: SURGERY

## 2017-02-21 PROCEDURE — 700102 HCHG RX REV CODE 250 W/ 637 OVERRIDE(OP)

## 2017-02-21 PROCEDURE — 160047 HCHG PACU  - EA ADDL 30 MINS PHASE II: Performed by: SURGERY

## 2017-02-21 PROCEDURE — A9270 NON-COVERED ITEM OR SERVICE: HCPCS

## 2017-02-21 PROCEDURE — 502571 HCHG PACK, LAP CHOLE: Performed by: SURGERY

## 2017-02-21 PROCEDURE — 160048 HCHG OR STATISTICAL LEVEL 1-5: Performed by: SURGERY

## 2017-02-21 PROCEDURE — 500697 HCHG HEMOCLIP, LARGE (ORANGE): Performed by: SURGERY

## 2017-02-21 PROCEDURE — 700111 HCHG RX REV CODE 636 W/ 250 OVERRIDE (IP)

## 2017-02-21 PROCEDURE — 160009 HCHG ANES TIME/MIN: Performed by: SURGERY

## 2017-02-21 PROCEDURE — 501583 HCHG TROCAR, THRD CAN&SEAL 5X100: Performed by: SURGERY

## 2017-02-21 PROCEDURE — 160039 HCHG SURGERY MINUTES - EA ADDL 1 MIN LEVEL 3: Performed by: SURGERY

## 2017-02-21 PROCEDURE — 110371 HCHG SHELL REV 272: Performed by: SURGERY

## 2017-02-21 PROCEDURE — 501338 HCHG SHEARS, ENDO: Performed by: SURGERY

## 2017-02-21 PROCEDURE — 110382 HCHG SHELL REV 271: Performed by: SURGERY

## 2017-02-21 PROCEDURE — 160028 HCHG SURGERY MINUTES - 1ST 30 MINS LEVEL 3: Performed by: SURGERY

## 2017-02-21 PROCEDURE — 501572 HCHG TROCAR, SHIELD OBTU 5X100: Performed by: SURGERY

## 2017-02-21 PROCEDURE — 501568 HCHG TROCAR, BLUNTPORT 12MM: Performed by: SURGERY

## 2017-02-21 PROCEDURE — 160046 HCHG PACU - 1ST 60 MINS PHASE II: Performed by: SURGERY

## 2017-02-21 PROCEDURE — 700101 HCHG RX REV CODE 250

## 2017-02-21 PROCEDURE — 502240 HCHG MISC OR SUPPLY RC 0272: Performed by: SURGERY

## 2017-02-21 PROCEDURE — A4606 OXYGEN PROBE USED W OXIMETER: HCPCS | Performed by: SURGERY

## 2017-02-21 PROCEDURE — 501399 HCHG SPECIMAN BAG, ENDO CATC: Performed by: SURGERY

## 2017-02-21 PROCEDURE — 160036 HCHG PACU - EA ADDL 30 MINS PHASE I: Performed by: SURGERY

## 2017-02-21 PROCEDURE — 501571 HCHG TROCAR, SEPARATOR 12X100: Performed by: SURGERY

## 2017-02-21 PROCEDURE — 88304 TISSUE EXAM BY PATHOLOGIST: CPT

## 2017-02-21 RX ORDER — BUPIVACAINE HYDROCHLORIDE AND EPINEPHRINE 5; 5 MG/ML; UG/ML
INJECTION, SOLUTION EPIDURAL; INTRACAUDAL; PERINEURAL
Status: DISCONTINUED | OUTPATIENT
Start: 2017-02-21 | End: 2017-02-21 | Stop reason: HOSPADM

## 2017-02-21 RX ORDER — ONDANSETRON 4 MG/1
4 TABLET, FILM COATED ORAL EVERY 4 HOURS PRN
Qty: 10 TAB | Refills: 1 | Status: SHIPPED | OUTPATIENT
Start: 2017-02-21

## 2017-02-21 RX ORDER — SODIUM CHLORIDE, SODIUM LACTATE, POTASSIUM CHLORIDE, CALCIUM CHLORIDE 600; 310; 30; 20 MG/100ML; MG/100ML; MG/100ML; MG/100ML
1000 INJECTION, SOLUTION INTRAVENOUS
Status: COMPLETED | OUTPATIENT
Start: 2017-02-21 | End: 2017-02-21

## 2017-02-21 RX ORDER — OXYCODONE HYDROCHLORIDE AND ACETAMINOPHEN 5; 325 MG/1; MG/1
1-2 TABLET ORAL EVERY 4 HOURS PRN
Qty: 30 TAB | Refills: 0 | Status: SHIPPED | OUTPATIENT
Start: 2017-02-21

## 2017-02-21 RX ADMIN — HYDROCODONE BITARTRATE AND ACETAMINOPHEN 15 ML: 2.5; 108 SOLUTION ORAL at 13:15

## 2017-02-21 RX ADMIN — SODIUM CHLORIDE, SODIUM LACTATE, POTASSIUM CHLORIDE, CALCIUM CHLORIDE 1000 ML: 600; 310; 30; 20 INJECTION, SOLUTION INTRAVENOUS at 10:30

## 2017-02-21 ASSESSMENT — PAIN SCALES - GENERAL
PAINLEVEL_OUTOF10: 0

## 2017-02-21 NOTE — DISCHARGE INSTRUCTIONS
ACTIVITY: Rest and take it easy for the first 24 hours.  A responsible adult is recommended to remain with you during that time.  It is normal to feel sleepy.  We encourage you to not do anything that requires balance, judgment or coordination.    MILD FLU-LIKE SYMPTOMS ARE NORMAL. YOU MAY EXPERIENCE GENERALIZED MUSCLE ACHES, THROAT IRRITATION, HEADACHE AND/OR SOME NAUSEA.    FOR 24 HOURS DO NOT:  Drive, operate machinery or run household appliances.  Drink beer or alcoholic beverages.   Make important decisions or sign legal documents.    SPECIAL INSTRUCTIONS: Laparoscopic Cholecystectomy D/C instructions:     1. DIET: Upon discharge from the hospital you may resume your normal preoperative diet. Depending on how you are feeling and whether you have nausea or not, you may wish to stay with a bland diet for the first few days. However, you can advance this as quickly as you feel ready.     2. ACTIVITIES: After discharge from the hospital, you may resume full routine activities. However, there should be no heavy lifting (greater than 15 pounds) and no strenuous activities until after your follow-up visit. Otherwise, routine activities of daily living are acceptable.     3. DRIVING: You may drive whenever you are off pain medications and are able to perform the activities needed to drive, i.e. turning, bending, twisting, etc.     4. BATHING: You may get the wound wet at any time after leaving the hospital. You may shower, but do not submerge in a bath for at least a week. Dressings may come off after 48 hours.     5. BOWEL FUNCTION: A few patients, after this operation, will develop either frequent or loose stools after meals. This usually corrects itself after a few days, to a few weeks. If this occurs, do not worry; it is not unusual and will resolve. Much more common than loose stools, is constipation. The combination of pain medication and decreased activity level can cause constipation in otherwise normal  patients. If you feel this is occurring, take a laxative (Milk of Magnesia, Ex-Lax, Senokot, etc.) until the problem has resolved.     6. PAIN MEDICATION: You will be given a prescription for pain medication at discharge. Please take these as directed. It is important to remember not to take medications on an empty stomach as this may cause nausea.     7.CALL IF YOU HAVE: (1) Fevers to more than 1010 F, (2) Unusual chest or leg pain, (3) Drainage or fluid from incision that may be foul smelling, increased tenderness or soreness at the wound or the wound edges are no longer together, redness or swelling at the incision site. Please do not hesitate to call with any other questions.     8. APPOINTMENT: Contact our office at [phone] for a follow-up appointment in 1 to 2 weeks following your procedure.     If you have any additional questions, please do not hesitate to call the office and speak to either myself or the physician on call.     Office address:   [address] [place] [id number]     Ubaldo Redding M.D.      DIET: To avoid nausea, slowly advance diet as tolerated, avoiding spicy or greasy foods for the first day.  Add more substantial food to your diet according to your physician's instructions.  Babies can be fed formula or breast milk as soon as they are hungry.  INCREASE FLUIDS AND FIBER TO AVOID CONSTIPATION.    SURGICAL DRESSING/BATHING:   Dressings may come off after 48 hours.  You may get the wound wet at any time after leaving the hospital. You may shower, but do not submerge in a bath for at least a week.     FOLLOW-UP APPOINTMENT:   Contact our office at [phone] for a follow-up appointment in 1 to 2 weeks following your procedure.    You should CALL YOUR PHYSICIAN if you develop:  Fever greater than 101 degrees F.  Pain not relieved by medication, or persistent nausea or vomiting.  Excessive bleeding (blood soaking through dressing) or unexpected drainage from the wound.  Extreme  redness or swelling around the incision site, drainage of pus or foul smelling drainage.  Inability to urinate or empty your bladder within 8 hours.  Problems with breathing or chest pain.    You should call 911 if you develop problems with breathing or chest pain.  If you are unable to contact your doctor or surgical center, you should go to the nearest emergency room or urgent care center.  Physician's telephone #: Dr. Redding [phone]    If any questions arise, call your doctor.  If your doctor is not available, please feel free to call the Surgical Center at [phone].  The Center is open Monday through Friday from 7AM to 7PM.  You can also call the HEALTH HOTLINE open 24 hours/day, 7 days/week and speak to a nurse at [phone], or toll free at [phone].    A registered nurse may call you a few days after your surgery to see how you are doing after your procedure.    MEDICATIONS: Resume taking daily medication.  Take prescribed pain medication with food.  If no medication is prescribed, you may take non-aspirin pain medication if needed.  PAIN MEDICATION CAN BE VERY CONSTIPATING.  Take a stool softener or laxative such as senokot, pericolace, or milk of magnesia if needed.    Prescription given for Percocet. Last pain medication given at 1:15pm.    If your physician has prescribed pain medication that includes Acetaminophen (Tylenol), do not take additional Acetaminophen (Tylenol) while taking the prescribed medication.    Depression / Suicide Risk    As you are discharged from this [facility] facility, it is important to learn how to keep safe from harming yourself.    Recognize the warning signs:  · Abrupt changes in personality, positive or negative- including increase in energy   · Giving away possessions  · Change in eating patterns- significant weight changes-  positive or negative  · Change in sleeping patterns- unable to sleep or sleeping all the time   · Unwillingness or inability to  communicate  · Depression  · Unusual sadness, discouragement and loneliness  · Talk of wanting to die  · Neglect of personal appearance   · Rebelliousness- reckless behavior  · Withdrawal from people/activities they love  · Confusion- inability to concentrate     If you or a loved one observes any of these behaviors or has concerns about self-harm, here's what you can do:  · Talk about it- your feelings and reasons for harming yourself  · Remove any means that you might use to hurt yourself (examples: pills, rope, extension cords, firearm)  · Get professional help from the community (Mental Health, Substance Abuse, psychological counseling)  · Do not be alone:Call your Safe Contact- someone whom you trust who will be there for you.  · Call your local CRISIS HOTLINE 785-9569 or 794-931-2702  · Call your local Children's Mobile Crisis Response Team [place] (266) 087-2601 or www.[employer]  · Call the toll free National Suicide Prevention Hotlines   · National Suicide Prevention Lifeline 842-778-GKPZ (9122)  · National Hope Line Network 800-SUICIDE (565-3153)

## 2017-02-21 NOTE — OR SURGEON
Immediate Post-Operative Note      PreOp Diagnosis: chronic cholecystitis    PostOp Diagnosis: chronic cholecystitis    Procedure(s):  CARMELO BY LAPAROSCOPY - Wound Class: Contaminated    Surgeon(s):  Ubaldo Redding M.D.    Anesthesiologist/Type of Anesthesia:  Anesthesiologist: Harris Bosch M.D./General    Surgical Staff:  Circulator: Rosa Spangler R.N.  Relief Circulator: Beto Mireles R.N.  Scrub Person: Russel Paul Assist: AMY Christian    Specimen: gallbladder    Estimated Blood Loss: 75 cc    Findings: stones and significant inflammation    Complications: none        2/21/2017 12:33 PM Ubaldo Redding

## 2017-02-21 NOTE — OP REPORT
DATE OF SERVICE:  02/21/2017    SURGEON:  Ubaldo Redding MD    ASSISTANT:  CONSTANZA Christian    PREOPERATIVE DIAGNOSES:  Chronic cholecystitis, symptomatic cholelithiasis.    POSTOPERATIVE DIAGNOSES:  Chronic cholecystitis, symptomatic cholelithiasis.    PROCEDURE PERFORMED:  Laparoscopic cholecystectomy.    ANESTHESIA:  General endotracheal anesthesia.    ANESTHESIOLOGIST:  Harris Bosch MD    INDICATIONS:  A 61-year-old man with symptomatic gallstones and chronic   cholecystitis, a cholecystectomy is indicated.    DESCRIPTION OF PROCEDURE:  Procedure discussed in detail with the patient   including laparoscopic approach, potential for converting to an open   procedure, and the associated risk of bleeding, infection, abscess, and   hematoma.  I also discussed risk of postoperative bile leak, common bile duct   stricture, and common bile duct transection.  I discussed the significance of   these complications and he understood all the above and wished to proceed.  He   was placed under anesthesia by Dr. Bosch.  His abdomen was prepped and   draped with chlorhexidine prep and sterile drapes.  After a timeout, an   infraumbilical incision was made.  Through this incision, peritoneal cavity   was entered using the open Seldinger technique.  Pneumoperitoneum was achieved   with CO2 insufflation at pressure 15 mmHg.  Under direct visualization, an 11   mm subxiphoid and two 5 mm subcostal ports were placed.  Gallbladder was   identified and noted to have significant amount of adhesions.  It was also   distended and could not be grasped.  It was drained with a percutaneous   drainage needle.  Subsequent to this, it was retracted superiorly and   laterally.  Multiple adhesions were taken down.  The base of the gallbladder   was then carefully dissected.  The cystic duct was identified and could be   seen to clearly exit the gallbladder and track laterally along with the   gallbladder.  In similar fashion, cystic artery  was identified and dissected   away from surrounding connective tissue.  Once this critical view had been   obtained, 3 clips were placed proximally and distally and the duct was divided   sharply with scissors.  In similar fashion, cystic artery was clipped twice   proximally, once distally and divided sharply with scissors.  The gallbladder   was then dissected off the liver bed and placed in a bag retrieval device.    Again, it had significant evidence of inflammation and stones were noted.    Hemostasis was assured with cautery.  Peritoneal cavity was irrigated   copiously.  There was no evidence of any bleeding or bile leak.  Ports   removed.  Pneumoperitoneum was released.  The infraumbilical fascia was   approximated with 0 Vicryl stitches.  Subcutaneous tissue was irrigated.  The   skin was approximated with 4-0 Vicryl stitches.  Dermabond was placed.  The   patient tolerated the procedure well without apparent complication.  Final   counts were reported as correct.       ____________________________________     MD SINDI CURRAN / MAMTA    DD:  02/21/2017 12:33:15  DT:  02/21/2017 15:16:07    D#:  [id number]  Job#:  [id number]    cc: Elio Baker MD

## 2017-02-21 NOTE — IP AVS SNAPSHOT
" Home Care Instructions                                                                                                                Name:Addison Dorado  Medical Record Number:[id number]  CSN: [id number]    YOB: 1955   Age: 61 y.o.  Sex: male  HT:1.702 m (5' 7\") WT: 72.5 kg (159 lb 13.3 oz)          Admit Date: 2/21/2017     Discharge Date:   Today's Date: 2/21/2017  Attending Doctor:  Ubaldo Redding M.D.                  Allergies:  Review of patient's allergies indicates no known allergies.                Discharge Instructions         ACTIVITY: Rest and take it easy for the first 24 hours.  A responsible adult is recommended to remain with you during that time.  It is normal to feel sleepy.  We encourage you to not do anything that requires balance, judgment or coordination.    MILD FLU-LIKE SYMPTOMS ARE NORMAL. YOU MAY EXPERIENCE GENERALIZED MUSCLE ACHES, THROAT IRRITATION, HEADACHE AND/OR SOME NAUSEA.    FOR 24 HOURS DO NOT:  Drive, operate machinery or run household appliances.  Drink beer or alcoholic beverages.   Make important decisions or sign legal documents.    SPECIAL INSTRUCTIONS: Laparoscopic Cholecystectomy D/C instructions:     1. DIET: Upon discharge from the hospital you may resume your normal preoperative diet. Depending on how you are feeling and whether you have nausea or not, you may wish to stay with a bland diet for the first few days. However, you can advance this as quickly as you feel ready.     2. ACTIVITIES: After discharge from the hospital, you may resume full routine activities. However, there should be no heavy lifting (greater than 15 pounds) and no strenuous activities until after your follow-up visit. Otherwise, routine activities of daily living are acceptable.     3. DRIVING: You may drive whenever you are off pain medications and are able to perform the activities needed to drive, i.e. turning, bending, twisting, etc.     4. BATHING: You may get the wound wet " at any time after leaving the hospital. You may shower, but do not submerge in a bath for at least a week. Dressings may come off after 48 hours.     5. BOWEL FUNCTION: A few patients, after this operation, will develop either frequent or loose stools after meals. This usually corrects itself after a few days, to a few weeks. If this occurs, do not worry; it is not unusual and will resolve. Much more common than loose stools, is constipation. The combination of pain medication and decreased activity level can cause constipation in otherwise normal patients. If you feel this is occurring, take a laxative (Milk of Magnesia, Ex-Lax, Senokot, etc.) until the problem has resolved.     6. PAIN MEDICATION: You will be given a prescription for pain medication at discharge. Please take these as directed. It is important to remember not to take medications on an empty stomach as this may cause nausea.     7.CALL IF YOU HAVE: (1) Fevers to more than 1010 F, (2) Unusual chest or leg pain, (3) Drainage or fluid from incision that may be foul smelling, increased tenderness or soreness at the wound or the wound edges are no longer together, redness or swelling at the incision site. Please do not hesitate to call with any other questions.     8. APPOINTMENT: Contact our office at [phone] for a follow-up appointment in 1 to 2 weeks following your procedure.     If you have any additional questions, please do not hesitate to call the office and speak to either myself or the physician on call.     Office address:   [address] [place] [address]     Ubaldo Redding M.D.      DIET: To avoid nausea, slowly advance diet as tolerated, avoiding spicy or greasy foods for the first day.  Add more substantial food to your diet according to your physician's instructions.  Babies can be fed formula or breast milk as soon as they are hungry.  INCREASE FLUIDS AND FIBER TO AVOID CONSTIPATION.    SURGICAL DRESSING/BATHING:   Dressings may  come off after 48 hours.  You may get the wound wet at any time after leaving the hospital. You may shower, but do not submerge in a bath for at least a week.     FOLLOW-UP APPOINTMENT:   Contact our office at [phone] for a follow-up appointment in 1 to 2 weeks following your procedure.    You should CALL YOUR PHYSICIAN if you develop:  Fever greater than 101 degrees F.  Pain not relieved by medication, or persistent nausea or vomiting.  Excessive bleeding (blood soaking through dressing) or unexpected drainage from the wound.  Extreme redness or swelling around the incision site, drainage of pus or foul smelling drainage.  Inability to urinate or empty your bladder within 8 hours.  Problems with breathing or chest pain.    You should call 911 if you develop problems with breathing or chest pain.  If you are unable to contact your doctor or surgical center, you should go to the nearest emergency room or urgent care center.  Physician's telephone #: Dr. Redding [phone]    If any questions arise, call your doctor.  If your doctor is not available, please feel free to call the Surgical Center at [phone].  The Center is open Monday through Friday from 7AM to 7PM.  You can also call the [employer] HOTLINE open 24 hours/day, 7 days/week and speak to a nurse at [phone], or toll free at [phone].    A registered nurse may call you a few days after your surgery to see how you are doing after your procedure.    MEDICATIONS: Resume taking daily medication.  Take prescribed pain medication with food.  If no medication is prescribed, you may take non-aspirin pain medication if needed.  PAIN MEDICATION CAN BE VERY CONSTIPATING.  Take a stool softener or laxative such as senokot, pericolace, or milk of magnesia if needed.    Prescription given for Percocet. Last pain medication given at 1:15pm.    If your physician has prescribed pain medication that includes Acetaminophen (Tylenol), do not take additional  Acetaminophen (Tylenol) while taking the prescribed medication.    Depression / Suicide Risk    As you are discharged from this [facility] facility, it is important to learn how to keep safe from harming yourself.    Recognize the warning signs:  · Abrupt changes in personality, positive or negative- including increase in energy   · Giving away possessions  · Change in eating patterns- significant weight changes-  positive or negative  · Change in sleeping patterns- unable to sleep or sleeping all the time   · Unwillingness or inability to communicate  · Depression  · Unusual sadness, discouragement and loneliness  · Talk of wanting to die  · Neglect of personal appearance   · Rebelliousness- reckless behavior  · Withdrawal from people/activities they love  · Confusion- inability to concentrate     If you or a loved one observes any of these behaviors or has concerns about self-harm, here's what you can do:  · Talk about it- your feelings and reasons for harming yourself  · Remove any means that you might use to hurt yourself (examples: pills, rope, extension cords, firearm)  · Get professional help from the community (Mental Health, Substance Abuse, psychological counseling)  · Do not be alone:Call your Safe Contact- someone whom you trust who will be there for you.  · Call your local CRISIS HOTLINE 209-6770 or 153-974-3184  · Call your local Children's Mobile Crisis Response Team [place] (125) 993-3836 or www.[employer]  · Call the toll free National Suicide Prevention Hotlines   · National Suicide Prevention Lifeline 014-625-CBCL (6898)  · National Hope Line Network 800-SUICIDE (498-7152)       Medication List      START taking these medications        Instructions    ondansetron 4 MG Tabs tablet   Commonly known as:  ZOFRAN    Take 1 Tab by mouth every four hours as needed.   Dose:  4 mg       oxycodone-acetaminophen 5-325 MG Tabs   Commonly known as:  PERCOCET    Take 1-2 Tabs by mouth every four  hours as needed.   Dose:  1-2 Tab               Medication Information     Above and/or attached are the medications your physician expects you to take upon discharge. Review all of your home medications and newly ordered medications with your doctor and/or pharmacist. Follow medication instructions as directed by your doctor and/or pharmacist. Please keep your medication list with you and share with your physician. Update the information when medications are discontinued, doses are changed, or new medications (including over-the-counter products) are added; and carry medication information at all times in the event of emergency situations.        Resources     Quit Smoking / Tobacco Use:    I understand the use of any tobacco products increases my chance of suffering from future heart disease or stroke and could cause other illnesses which may shorten my life. Quitting the use of tobacco products is the single most important thing I can do to improve my health. For further information on smoking / tobacco cessation call a Toll Free Quit Line at 1-182.298.5769 (*[employer] [place]) or 1-784.214.7987 (American Lung Association) or you can access the web based program at www.[employer].org.    [place] Tobacco Users Help Line:  (601) 308-8422       Toll Free: 1-804.898.2782  Quit Tobacco Program [facility] (590)159-2942    Crisis Hotline:    [place] Crisis Hotline:  2-846-SIXEAYP or 1-852.461.4132    [place] Crisis Hotline:    1-178.241.5467 or 007-777-1687    Discharge Survey:   Thank you for choosing [facility]. We hope we did everything we could to make your hospital stay a pleasant one. You may be receiving a survey and we would appreciate your time and participation in answering the questions. Your input is very valuable to us in our efforts to improve our service to our patients and their families.            Signatures     My signature on this form indicates that:    1. I acknowledge  receipt and understanding of these Home Care Instruction.  2. My questions regarding this information have been answered to my satisfaction.  3. I have formulated a plan with my discharge nurse to obtain my prescribed medications for home.    __________________________________      __________________________________                   Patient Signature                                 Guardian/Responsible Adult Signature      __________________________________                 __________       ________                       Nurse Signature                                               Date                 Time

## 2017-02-21 NOTE — IP AVS SNAPSHOT
2/21/2017          [facility]  [address]  [place] [address]    Dear Addison:    On license of [facility] wants to ensure your discharge home is safe and you or your loved ones have had all your questions answered regarding your care after you leave the hospital.    You may receive a telephone call within two days of your discharge.  This call is to make certain you understand your discharge instructions as well as ensure we provided you with the best care possible during your stay with us.     The call will only last approximately 3-5 minutes and will be done by a nurse.    Once again, we want to ensure your discharge home is safe and that you have a clear understanding of any next steps in your care.  If you have any questions or concerns, please do not hesitate to contact us, we are here for you.  Thank you for choosing [facility] for your healthcare needs.    Sincerely,    Song Kumar    [facility]

## 2020-11-29 ENCOUNTER — HOSPITAL ENCOUNTER (EMERGENCY)
Dept: HOSPITAL 8 - ED | Age: 65
Discharge: HOME | End: 2020-11-29
Payer: MEDICARE

## 2020-11-29 VITALS — HEIGHT: 68 IN | BODY MASS INDEX: 24.32 KG/M2 | WEIGHT: 160.5 LBS

## 2020-11-29 VITALS — DIASTOLIC BLOOD PRESSURE: 77 MMHG | SYSTOLIC BLOOD PRESSURE: 131 MMHG

## 2020-11-29 DIAGNOSIS — J18.9: Primary | ICD-10-CM

## 2020-11-29 LAB
ALBUMIN SERPL-MCNC: 2.9 G/DL (ref 3.4–5)
ANION GAP SERPL CALC-SCNC: 8 MMOL/L (ref 5–15)
BASOPHILS # BLD AUTO: 0 X10^3/UL (ref 0–0.1)
BASOPHILS NFR BLD AUTO: 0 % (ref 0–1)
CALCIUM SERPL-MCNC: 8.8 MG/DL (ref 8.5–10.1)
CHLORIDE SERPL-SCNC: 103 MMOL/L (ref 98–107)
CREAT SERPL-MCNC: 0.99 MG/DL (ref 0.7–1.3)
EOSINOPHIL # BLD AUTO: 0 X10^3/UL (ref 0–0.4)
EOSINOPHIL NFR BLD AUTO: 0 % (ref 1–7)
ERYTHROCYTE [DISTWIDTH] IN BLOOD BY AUTOMATED COUNT: 15 % (ref 9.4–14.8)
LYMPHOCYTES # BLD AUTO: 0.6 X10^3/UL (ref 1–3.4)
LYMPHOCYTES NFR BLD AUTO: 5 % (ref 22–44)
MCH RBC QN AUTO: 27.8 PG (ref 27.5–34.5)
MCHC RBC AUTO-ENTMCNC: 33.2 G/DL (ref 33.2–36.2)
MD: NO
MONOCYTES # BLD AUTO: 0.5 X10^3/UL (ref 0.2–0.8)
MONOCYTES NFR BLD AUTO: 4 % (ref 2–9)
NEUTROPHILS # BLD AUTO: 11.4 X10^3/UL (ref 1.8–6.8)
NEUTROPHILS NFR BLD AUTO: 91 % (ref 42–75)
PLATELET # BLD AUTO: 227 X10^3/UL (ref 130–400)
PMV BLD AUTO: 7.8 FL (ref 7.4–10.4)
RBC # BLD AUTO: 4.93 X10^6/UL (ref 4.38–5.82)

## 2020-11-29 PROCEDURE — 87040 BLOOD CULTURE FOR BACTERIA: CPT

## 2020-11-29 PROCEDURE — 36415 COLL VENOUS BLD VENIPUNCTURE: CPT

## 2020-11-29 PROCEDURE — 99284 EMERGENCY DEPT VISIT MOD MDM: CPT

## 2020-11-29 PROCEDURE — 83605 ASSAY OF LACTIC ACID: CPT

## 2020-11-29 PROCEDURE — 96365 THER/PROPH/DIAG IV INF INIT: CPT

## 2020-11-29 PROCEDURE — 85025 COMPLETE CBC W/AUTO DIFF WBC: CPT

## 2020-11-29 PROCEDURE — 96367 TX/PROPH/DG ADDL SEQ IV INF: CPT

## 2020-11-29 PROCEDURE — 82040 ASSAY OF SERUM ALBUMIN: CPT

## 2020-11-29 PROCEDURE — 80048 BASIC METABOLIC PNL TOTAL CA: CPT

## 2020-11-29 NOTE — NUR
PT MEETING SIRS CRITERIA OF TACHYCARDIA, TACHYPNEA AND PNEUMONIA PER URGENT 
CARE XRAY PTA TODAY. SECOND BLOOD CULTURE ORDERED. PIV PLACED, BLOOD CX DRAWN 
WITH PIV START. BLOOD CX ALREADY DRAWN BY LAB. LAB NOTIFIED BLOOD CX FROM PIV 
START IS AT BEDSIDE. PT IS A&O, RESPS EVEN AND UNLABORED. SINUS TACH ON CARDIAC 
MONTIOR, NO ECTOPY NOTED. PT IS 92% AT THE LOWEST WHEN MONITORED ON ROOM AIR BY 
THIS RN, MD INFORMED. PT PLACED ON OXYGEN AT 4L/MIN BY MD GARZA FOR PT 
COMFORT. MD INFORMED PT HAS C/O SOB ON ARRIVAL, PER MD NO EKG INDICATED AT THIS 
TIME.

## 2020-11-29 NOTE — NUR
LATE ENTRY FOR 1315: PT PRESENTS TO ED WITH C/O COUGH AND DIARRHEA X 3 DAYS, 
SOB STARTING TODAY. PT IS A&O, RESPS EVEN AND UNLABORED, ABLE TO SPEAK IN FULL 
SENTENCES WITHOUT DIFFICULTY. PT PLACED ON ALL MONTIORS, CALL LIGHT IN REACH. 
AWAITING MD AND ORDERS.

## 2020-11-29 NOTE — NUR
OXYGEN DISCONTINUED FOR OBSERVATION OVER LAST 30 MIN, PT'S SPO2 IS 90-92% ON 
ROOM AIR. RESPIRATORY RATE IS 30-45, SHALLOW BUT NO USE OF ACCESSORY MUSCLES. 
PT DENIES SOB. JOHN GARZA NOTIFIED. MD STATES SHE OFFERED ADMISSION TO PT, PT 
AND FAMILY DECLINED ADMIT. RN INSTRUCTED TO DISCHARGE PT.

## 2020-11-29 NOTE — NUR
PT AMBULATED AROUND UNIT WITH CONTINUOUS PULSE OXYMETRY MONITORING, SPO2 
DROPPED AS LOW AS 90% ON ROOM AIR WITH AMBULATION. PT HAD NO S/SX RESPIRATORY 
DISTRESS DURING OR POST AMBULATION. PT IS A&O, RESPS EVEN AND UNLABORED, NO 
COMPLAINT UPON DISCHARGE. AZITHROMYCIN INFUSION COMPLETE, PIV DC'D WITH TIP 
INTACT. PT AND SON GIVEN DISCHARGE INSTRUCTIONS WITH RETURN CRITERIA AND 
ISOLATION GUIDELINES. PT AMBULATORY TO DC WITH STEADY GAIT, SON ACCOMPANYING TO 
DRIVE PT HOME.

## 2020-11-29 NOTE — NUR
rocephin gtt completed, zithromycin gtt initiated. pt is a&ox4, resps even and 
unlabored speaking in full sentences without difficulty. pt is sinus tach rate 
90s with no ectopy noted. pt denies pain. juice provided per pt request. pt to 
be discharged after zithromax infusion.

## 2021-03-03 DIAGNOSIS — Z23 NEED FOR VACCINATION: ICD-10-CM

## (undated) DEVICE — CANNULA W/SEAL 5X100 Z-THRE - ADED KII (12/BX)

## (undated) DEVICE — TROCAR 5X100 BLADED Z-THREAD - KII (6/BX)

## (undated) DEVICE — TROCAR LAPSCP 100MM 12MM NTHRD - (6/BX)

## (undated) DEVICE — SCISSORS 5MM CVD (6EA/BX)

## (undated) DEVICE — TUBE E-T HI-LO CUFF 8.0MM (10EA/PK)

## (undated) DEVICE — WATER IRRIG. STER. 1500 ML - (9/CA)

## (undated) DEVICE — ELECTRODE DUAL RETURN W/ CORD - (50/PK)

## (undated) DEVICE — SET LEADWIRE 5 LEAD BEDSIDE DISPOSABLE ECG (1SET OF 5/EA)

## (undated) DEVICE — DERMABOND ADVANCED - (12EA/BX)

## (undated) DEVICE — PACK LAP CHOLE OR - (2EA/CA)

## (undated) DEVICE — SET SUCTION/IRRIGATION WITH DISPOSABLE TIP (6/CA )PART #0250-070-520 IS A SUB

## (undated) DEVICE — LACTATED RINGERS INJ 1000 ML - (14EA/CA 60CA/PF)

## (undated) DEVICE — LEAD SET 6 DISP. EKG NIHON KOHDEN

## (undated) DEVICE — SUTURE 4-0 VICRYL PLUS FS-2 - 27 INCH (36/BX)

## (undated) DEVICE — TROCAR Z THREAD12MM OPTICAL - NON BLADED (6/BX)

## (undated) DEVICE — BLADE SURGICAL CLIPPER - (50EA/CA)

## (undated) DEVICE — BAG RETRIEVAL 10ML (10EA/BX)

## (undated) DEVICE — TUBING INSUFFLATION - (10/BX)

## (undated) DEVICE — SENSOR SPO2 NEO LNCS ADHESIVE (20/BX) SEE USER NOTES

## (undated) DEVICE — GLOVE BIOGEL SZ 7 SURGICAL PF LTX - (50PR/BX 4BX/CA)

## (undated) DEVICE — HEAD HOLDER JUNIOR/ADULT

## (undated) DEVICE — CLIP MED LG INTNL HRZN TI ESCP - (20/BX)

## (undated) DEVICE — NEPTUNE 4 PORT MANIFOLD - (20/PK)

## (undated) DEVICE — SUCTION INSTRUMENT YANKAUER BULBOUS TIP W/O VENT (50EA/CA)

## (undated) DEVICE — DETERGENT RENUZYME PLUS 10 OZ PACKET (50/BX)

## (undated) DEVICE — GLOVE BIOGEL INDICATOR SZ 7SURGICAL PF LTX - (50/BX 4BX/CA)

## (undated) DEVICE — PROTECTOR ULNA NERVE - (36PR/CA)

## (undated) DEVICE — SUTURE 0 VICRYL PLUS CT-2 - 27 INCH (36/BX)

## (undated) DEVICE — MASK ANESTHESIA ADULT  - (100/CA)

## (undated) DEVICE — SODIUM CHL IRRIGATION 0.9% 1000ML (12EA/CA)

## (undated) DEVICE — KIT ANESTHESIA W/CIRCUIT & 3/LT BAG W/FILTER (20EA/CA)

## (undated) DEVICE — KIT ROOM DECONTAMINATION

## (undated) DEVICE — CHLORAPREP 26 ML APPLICATOR - ORANGE TINT(25/CA)

## (undated) DEVICE — TUBE CONNECT SUCTION CLEAR 120 X 1/4" (50EA/CA)"

## (undated) DEVICE — SET EXTENSION WITH 2 PORTS (48EA/CA) ***PART #2C8610 IS A SUBSTITUTE*****

## (undated) DEVICE — GOWN WARMING STANDARD FLEX - (30/CA)

## (undated) DEVICE — GLOVE BIOGEL SZ 8 SURGICAL PF LTX - (50PR/BX 4BX/CA)

## (undated) DEVICE — TUBING CLEARLINK DUO-VENT - C-FLO (48EA/CA)

## (undated) DEVICE — CANISTER SUCTION 3000ML MECHANICAL FILTER AUTO SHUTOFF MEDI-VAC NONSTERILE LF DISP  (40EA/CA)

## (undated) DEVICE — SUTURE 0 COATED VICRYL 6-18IN - (12PK/BX)

## (undated) DEVICE — ELECTRODE 850 FOAM ADHESIVE - HYDROGEL RADIOTRNSPRNT (50/PK)

## (undated) DEVICE — SYRINGE 30 ML LL (56/BX)

## (undated) DEVICE — SUTURE GENERAL